# Patient Record
Sex: FEMALE | Race: WHITE | Employment: PART TIME | URBAN - METROPOLITAN AREA
[De-identification: names, ages, dates, MRNs, and addresses within clinical notes are randomized per-mention and may not be internally consistent; named-entity substitution may affect disease eponyms.]

---

## 2017-10-29 ENCOUNTER — HOSPITAL ENCOUNTER (OUTPATIENT)
Age: 57
Discharge: HOME | End: 2017-10-29
Payer: COMMERCIAL

## 2017-10-29 VITALS
OXYGEN SATURATION: 100 % | TEMPERATURE: 98 F | RESPIRATION RATE: 18 BRPM | DIASTOLIC BLOOD PRESSURE: 55 MMHG | SYSTOLIC BLOOD PRESSURE: 118 MMHG | HEART RATE: 78 BPM

## 2017-10-29 DIAGNOSIS — L50.0 ALLERGIC URTICARIA: ICD-10-CM

## 2017-10-29 DIAGNOSIS — T78.40XA ALLERGIC REACTION, INITIAL ENCOUNTER: Primary | ICD-10-CM

## 2017-10-29 PROCEDURE — 99214 OFFICE O/P EST MOD 30 MIN: CPT

## 2017-10-29 PROCEDURE — 99213 OFFICE O/P EST LOW 20 MIN: CPT

## 2017-10-29 RX ORDER — DIPHENHYDRAMINE HCL 25 MG
50 TABLET ORAL EVERY 6 HOURS PRN
Qty: 30 TABLET | Refills: 0 | Status: SHIPPED | OUTPATIENT
Start: 2017-10-29

## 2017-10-29 RX ORDER — CLINDAMYCIN HYDROCHLORIDE 150 MG/1
CAPSULE ORAL
COMMUNITY
Start: 2017-10-18

## 2017-10-29 RX ORDER — PREDNISONE 20 MG/1
40 TABLET ORAL DAILY
Qty: 10 TABLET | Refills: 0 | Status: SHIPPED | OUTPATIENT
Start: 2017-10-29 | End: 2017-11-03

## 2017-10-29 RX ORDER — FAMOTIDINE 20 MG/1
20 TABLET ORAL DAILY
Qty: 5 TABLET | Refills: 0 | Status: SHIPPED | OUTPATIENT
Start: 2017-10-29 | End: 2017-11-03

## 2017-10-29 NOTE — ED INITIAL ASSESSMENT (HPI)
REPORTS ITCHY RASH FROM KNEES TO CHEST SINCE Thursday, STATES SHE JUST FINISHED AN ANTIBIOTIC FROM HER DENTIST ON Thursday. PATIENT ALSO USING SILVER HYDROCOLLOID BANDAGES TO HER RIGHT HAND WOUND BEFORE RASH APPEARED.

## 2017-10-29 NOTE — ED PROVIDER NOTES
Patient presents with: Allergic Rxn Allergies (immune)      HPI:     Altagracia Bermudez is a 65year old female who presents today with a chief complaint of hives that started a couple days ago.   The patient states she just recently finished a 7 of a clindamy no distress  SKIN: good skin turgor, see above description for rash  HEENT: atraumatic, normocephalic, ears, nose and throat are clear. No swelling. Uvula midline. Speech clear. No dental swelling or abscess.   EYES: sclera non icteric bilateral. No swellin days

## 2023-10-27 ENCOUNTER — HOSPITAL ENCOUNTER (OUTPATIENT)
Age: 63
Discharge: HOME | End: 2023-10-27
Payer: COMMERCIAL

## 2023-10-27 ENCOUNTER — APPOINTMENT (OUTPATIENT)
Dept: GENERAL RADIOLOGY | Age: 63
End: 2023-10-27
Attending: NURSE PRACTITIONER
Payer: COMMERCIAL

## 2023-10-27 VITALS
OXYGEN SATURATION: 100 % | RESPIRATION RATE: 20 BRPM | TEMPERATURE: 97 F | HEART RATE: 73 BPM | SYSTOLIC BLOOD PRESSURE: 143 MMHG | DIASTOLIC BLOOD PRESSURE: 66 MMHG

## 2023-10-27 DIAGNOSIS — S62.660B OPEN NONDISPLACED FRACTURE OF DISTAL PHALANX OF RIGHT INDEX FINGER, INITIAL ENCOUNTER: Primary | ICD-10-CM

## 2023-10-27 PROCEDURE — 99203 OFFICE O/P NEW LOW 30 MIN: CPT

## 2023-10-27 PROCEDURE — 26750 TREAT FINGER FRACTURE EACH: CPT

## 2023-10-27 PROCEDURE — 73140 X-RAY EXAM OF FINGER(S): CPT | Performed by: NURSE PRACTITIONER

## 2023-10-27 PROCEDURE — 90471 IMMUNIZATION ADMIN: CPT

## 2023-10-27 RX ORDER — HYDROCODONE BITARTRATE AND ACETAMINOPHEN 5; 325 MG/1; MG/1
1-2 TABLET ORAL EVERY 6 HOURS PRN
Qty: 10 TABLET | Refills: 0 | Status: SHIPPED | OUTPATIENT
Start: 2023-10-27 | End: 2023-11-01

## 2023-10-27 RX ORDER — LIDOCAINE HYDROCHLORIDE 10 MG/ML
1 INJECTION, SOLUTION EPIDURAL; INFILTRATION; INTRACAUDAL; PERINEURAL ONCE
Status: COMPLETED | OUTPATIENT
Start: 2023-10-27 | End: 2023-10-27

## 2023-10-27 RX ORDER — CEPHALEXIN 500 MG/1
500 CAPSULE ORAL 4 TIMES DAILY
Qty: 40 CAPSULE | Refills: 0 | Status: SHIPPED | OUTPATIENT
Start: 2023-10-27 | End: 2023-11-06

## 2023-10-28 NOTE — DISCHARGE INSTRUCTIONS
Rest, ice, compress finger for pain. REFER TO HANDOUT FOR FURTHER DISCHARGE CARE. MAY TAKE OVER THE COUNTER MEDICATIONS SUCH AS TYLENOL (ACETAMINOPHEN)  OR MOTRIN (IBUPROFEN) FOR PAIN. -Keep wound and bandage on for the next 24 hrs.  -May wash wound with soap and water at least twice daily. Allow to dry and place antibiotic  ointment and band aid/dressing for 1-2 days only. When at home- leave wound open to air.  -No soaking, taking baths, swimming in the next 5 days.  -Change bandage as directed.     -See your PMD to have sutures removed in 10-14 days.  -Please call your doctor or return if you notice signs of infection including:  a)Redness  b)Swelling  c)Foul odor  d)Discharge  e)Fever  f)Increased Pain  g)If the wound becomes warm to the touch

## 2023-10-30 ENCOUNTER — TELEPHONE (OUTPATIENT)
Dept: SURGERY | Facility: CLINIC | Age: 63
End: 2023-10-30

## 2023-10-30 NOTE — TELEPHONE ENCOUNTER
Called patient and LVM x2 to schedule appt today at 12:30 with Dr. Rawleigh Boeck for finger laceration injury. Needs surgey.

## 2023-11-06 ENCOUNTER — OFFICE VISIT (OUTPATIENT)
Dept: SURGERY | Facility: CLINIC | Age: 63
End: 2023-11-06

## 2023-11-06 DIAGNOSIS — S62.662A CLOSED NONDISPLACED FRACTURE OF DISTAL PHALANX OF RIGHT MIDDLE FINGER, INITIAL ENCOUNTER: Primary | ICD-10-CM

## 2023-11-06 PROCEDURE — 99204 OFFICE O/P NEW MOD 45 MIN: CPT | Performed by: PLASTIC SURGERY

## 2023-11-06 NOTE — H&P
Luiza Santoro is a 59year old female that presents with Patient presents with:  Laceration: Laceration with fracture dorsal distal right middle finger   .     REFERRED BY:  Marli Sinclair    Pacemaker: No  Latex Allergy: no  Coumadin: No  Plavix: No  Other anticoagulants: No  Cardiac stents: No    HAND DOMINANCE:  Right    Profession:  special needs    RECONSTRUCTIVE HISTORY    SUN EXPOSURE   Current yes   Past yes   Sunburns yes   Tanning salons current no   Tanning salons past no     SKIN CANCER    Personal history of skin cancer: basal cell carcinoma, squamous cell carcinoma face, left hand and arm      HPI:       Injury 1: RMF DP comminuted, nondisplaced, seen 10 days postinjury  - Date: 10/27/23  - Days Since: 9      64-year-old female right-hand-dominant with RMF fracture     injury, ER, 1 suture and splinted  Keflex    Pain with bumping    Washing Neosporin    Stop wearing her splint 2 days ago            Review of Systems:   Constitutional: No change in appetite, chill/rigors, or fatigue  GI: No jaundice  Endocrine: No generalized weakness  Neurological: No aphasia, loss of consciousness, or seizures    Musculoskeletal:      Date of injury 10/27/23     Location right      middle finger   Dorsal distal    Mechanism Sustained laceration and fracture while cutting bushes with a hedger     Treatment Seen by ER, 1 suture placed, splinted started on oral keflex        Pain  yes when bumps 8/10      Numbness Yes to distal phalanx   Washing laceration with soap and water, applying neosporin and gauze daily  Pt removed splint 11/4    PMH:     MEDICAL  Past Medical History:   Diagnosis Date    Biceps tendon tear 2007    (R)    Vitamin D deficiency 2/29/12        SURGICAL  Past Surgical History:   Procedure Laterality Date    ANESTH,BICEPS TENDON REPAIR      (R)    DENTAL PROCEDURE      wisdom teeth    KNEE ARTHROSCP HARV      (R)        ALONSO Steele [Bupropion Hc* RASH  Clindamycin             RASH     MEDICATIONS  Current Outpatient Medications   Medication Sig Dispense Refill    Multiple Vitamin (MULTI VITAMIN DAILY OR) Take 1 tablet by mouth As Directed. CALCIUM OR Take 1 tablet by mouth As Directed. MAGNESIUM OR Take 1 tablet by mouth As Directed. cephalexin 500 MG Oral Cap Take 1 capsule (500 mg total) by mouth 4 (four) times daily for 10 days. 40 capsule 0    Clindamycin HCl 150 MG Oral Cap       DiphenhydrAMINE HCl (BENADRYL) 25 MG Oral Tab Take 2 tablets (50 mg total) by mouth every 6 (six) hours as needed for Itching. 30 tablet 0    Omega-3 Fatty Acids (FISH OIL) 1000 MG Oral Cap 1 capsule twice daily 60 Cap 0    VITAMIN D, ERGOCALCIFEROL, OR Take by mouth As Directed. SOCIAL HISTORY  Social History     Socioeconomic History    Marital status: Single    Number of children: 0   Occupational History    Occupation: [employer]    Tobacco Use    Smoking status: Every Day     Packs/day: 1.00     Years: 20.00     Additional pack years: 0.00     Total pack years: 20.00     Types: Cigarettes    Smokeless tobacco: Never   Substance and Sexual Activity    Alcohol use:  Yes     Alcohol/week: 0.0 - 1.7 standard drinks of alcohol    Drug use: No   Other Topics Concern    Right Handed Yes        FAMILY HISTORY  Family History   Problem Relation Age of Onset    Diabetes Mother           PHYSICAL EXAM:     CONSTITUTIONAL: Overall appearance - Normal  HEENT: Normocephalic  EYES: Conjunctiva - Right: Normal, Left: Normal; EOMI  EARS: Inspection - Right: Normal, Left: Normal  NECK/THYROID: Inspection - Normal, Palpation - Normal, Thyroid gland - Normal, No adenopathy  RESPIRATORY: Inspection - Normal, Effort - Normal  CARDIOVASCULAR: Regular rhythm, No murmurs  ABDOMEN: Inspection - Normal, No abdominal tenderness  NEURO: Memory intact  PSYCH: Oriented to person, place, time, and situation, Appropriate mood and affect      Hand Physical Exam:     RMF 1 cm transverse laceration, nail plate, nondisplaced, sutured, noninfected  Terminal slip and FDP intact  DIP collateral ligaments intact    X-ray independently interpreted: Comminuted fracture distal phalanx, nondisplaced    ASSESSMENT/PLAN:     Fracture: RMF distal phalanx, comminuted, nondisplaced, seen 10 days post injury  Laceration, sutured    We discussed the fracture/dislocation and the treatment options. Questions were answered and the patient wishes to proceed with treatment. SPLINT - This fracture can be treated with a splint. We discussed splint care and instructions, as well as restrictions. If there is displacement of the fracture, surgery may be required. We discussed restrictions. Even with satisfactory healing, the hand/digit may not regain normal ROM or normal function.       Finger extension splint  Wash daily, Polysporin, Band-Aid  2 weeks active range tendon gliding  3 weeks strengthening and resistive  4 weeks discontinue splint  5 weeks return to work regular duty, 11/11 11/6/2023  Clifford Garza MD        +++++++++++++++++++++++++++++++++++++++++      MEDICAL DECISION MAKING    PROBLEMS      MODERATE    (number / complexity)          Acute complicated injury    DATA         MODERATE    (amount / complexity)          X-rays independently reviewed    MANAGEMENT RISK  LOW    (complications/ morbidity)       Splint/OT                  MDM LEVEL    MODERATE

## 2023-11-07 ENCOUNTER — OFFICE VISIT (OUTPATIENT)
Dept: SURGERY | Facility: CLINIC | Age: 63
End: 2023-11-07

## 2023-11-07 DIAGNOSIS — M62.81 DISTAL MUSCLE WEAKNESS: Primary | ICD-10-CM

## 2023-11-07 DIAGNOSIS — M25.641 JOINT STIFFNESS OF HAND, RIGHT: ICD-10-CM

## 2023-11-07 PROCEDURE — 29130 APPL FINGER SPLINT STATIC: CPT | Performed by: OCCUPATIONAL THERAPIST

## 2023-11-07 NOTE — PROGRESS NOTES
Subjective: I hurt my RMF with a hedger. Objective:     Current level of performance:  ADL: Independent  Work:   Leisure: Not addressed    Measurements/Tests:  ROM:         N/A         Treatment Provided this day: Fabricated a RMF extension splint per order. Treatment Time: 20 minutes      Summary/Analysis of Treatment session: Tolerated the fabrication of  RMF extension splint well. Plan: To be compliant with the wear and care of RMF extension splint x 4 weeks: Follow up in:  x 2 weeks.           Geremias Salazar  OTR/L

## 2023-11-09 ENCOUNTER — HOSPITAL ENCOUNTER (OUTPATIENT)
Age: 63
Discharge: HOME | End: 2023-11-09
Payer: COMMERCIAL

## 2023-11-09 VITALS
DIASTOLIC BLOOD PRESSURE: 70 MMHG | RESPIRATION RATE: 16 BRPM | TEMPERATURE: 97 F | HEART RATE: 85 BPM | OXYGEN SATURATION: 98 % | SYSTOLIC BLOOD PRESSURE: 119 MMHG

## 2023-11-09 DIAGNOSIS — Z48.02 ENCOUNTER FOR REMOVAL OF SUTURES: Primary | ICD-10-CM

## 2023-11-09 NOTE — ED INITIAL ASSESSMENT (HPI)
Patient arrives ambulatory with need for stitch removal. Had stitches placed on 10/25 to right 3rd finger

## 2024-02-14 ENCOUNTER — HOSPITAL ENCOUNTER (OUTPATIENT)
Age: 64
Discharge: HOME | End: 2024-02-14
Attending: EMERGENCY MEDICINE
Payer: COMMERCIAL

## 2024-02-14 VITALS
OXYGEN SATURATION: 97 % | DIASTOLIC BLOOD PRESSURE: 60 MMHG | RESPIRATION RATE: 19 BRPM | HEART RATE: 95 BPM | TEMPERATURE: 98 F | SYSTOLIC BLOOD PRESSURE: 112 MMHG

## 2024-02-14 DIAGNOSIS — J20.9 ACUTE BRONCHITIS, UNSPECIFIED ORGANISM: Primary | ICD-10-CM

## 2024-02-14 LAB
POCT INFLUENZA A: NEGATIVE
POCT INFLUENZA B: NEGATIVE
S PYO AG THROAT QL IA.RAPID: NEGATIVE

## 2024-02-14 PROCEDURE — 99213 OFFICE O/P EST LOW 20 MIN: CPT

## 2024-02-14 PROCEDURE — 87651 STREP A DNA AMP PROBE: CPT | Performed by: EMERGENCY MEDICINE

## 2024-02-14 PROCEDURE — 87502 INFLUENZA DNA AMP PROBE: CPT | Performed by: EMERGENCY MEDICINE

## 2024-02-14 RX ORDER — AZITHROMYCIN 250 MG/1
TABLET, FILM COATED ORAL
Qty: 6 TABLET | Refills: 0 | Status: SHIPPED | OUTPATIENT
Start: 2024-02-14

## 2024-02-15 NOTE — DISCHARGE INSTRUCTIONS
Thank you for visiting our immediate care for your health care needs.  Please follow up with your regular doctor in the next 1-2 days.  If you have any additional problems please return to the immediate care.  Please take Zithromax as prescribed.

## 2024-02-15 NOTE — ED PROVIDER NOTES
Patient Seen in: [facility]      History     Chief Complaint   Patient presents with    Cough/URI    Nasal Congestion     Stated Complaint: cough and congestion    Subjective:   HPI    63-year-old female presents today for evaluation cough congestion for the past 2 weeks.  Patient is a smoker.  Is a schoolbus .  No fevers or chills.  No chest pain or shortness of breath.  No vomiting or diarrhea.  Symptoms are acute mild.    Objective:   No pertinent past medical history.            No pertinent past surgical history.              No pertinent social history.            Review of Systems    Positive for stated complaint: cough and congestion  Other systems are as noted in HPI.  Constitutional and vital signs reviewed.      All other systems reviewed and negative except as noted above.    Physical Exam     ED Triage Vitals [02/14/24 1816]   /60   Pulse 95   Resp 19   Temp 98.2 °F (36.8 °C)   Temp src Temporal   SpO2 97 %   O2 Device None (Room air)       Current:/60   Pulse 95   Temp 98.2 °F (36.8 °C) (Temporal)   Resp 19   SpO2 97%         Physical Exam  Vitals reviewed.   Constitutional:       General: She is not in acute distress.     Appearance: Normal appearance. She is not ill-appearing or toxic-appearing.   HENT:      Head: Normocephalic and atraumatic.      Mouth/Throat:      Mouth: Mucous membranes are moist.      Pharynx: Oropharynx is clear. No pharyngeal swelling, oropharyngeal exudate or posterior oropharyngeal erythema.   Eyes:      Extraocular Movements: Extraocular movements intact.      Conjunctiva/sclera: Conjunctivae normal.   Cardiovascular:      Rate and Rhythm: Normal rate and regular rhythm.   Pulmonary:      Effort: Pulmonary effort is normal.      Breath sounds: Normal breath sounds.   Musculoskeletal:      Cervical back: Neck supple.   Skin:     General: Skin is warm and dry.   Neurological:      Mental Status: She is alert and oriented to person, place,  and time.   Psychiatric:         Mood and Affect: Mood normal.               ED Course     Labs Reviewed   POCT FLU TEST - Normal    Narrative:     This assay is a rapid molecular in vitro test utilizing nucleic acid amplification of influenza A and B viral RNA.   RAPID STREP A - Normal                      MDM        63 year old female with viral symptoms.  Will check swabs.    Differential diagnosis (including but not limited to):  Flu, COVID, other viral syndrome    ED course:  Pulse Ox: 97% on room air which is normal      Comment: Please note this report has been produced using speech recognition software and may contain errors related to that system including errors in grammar, punctuation, and spelling, as well as words and phrases that may be inappropriate. If there are any questions or concerns please feel free to contact the dictating provider for clarification.                                     Medical Decision Making      Disposition and Plan     Clinical Impression:  1. Acute bronchitis, unspecified organism         Disposition:  There is no disposition on file for this visit.  There is no disposition time on file for this visit.    Follow-up:  Tj Eric MD  [address] [facility] [id number] [phone]                Medications Prescribed:  Current Discharge Medication List        START taking these medications    Details   azithromycin (ZITHROMAX Z-ANAMARIA) 250 MG Oral Tab 500 mg once followed by 250 mg daily x 4 days  Qty: 6 tablet, Refills: 0

## (undated) NOTE — LETTER
23      Patient: Mickie Ordonez  : 5/3/1960 Visit date: 2023    Dear Gris Rousseau,      I examined your patient in consultation today. She has a comminuted nondisplaced distal phalangeal fracture of the right middle finger, with a sutured noninfected laceration. We have fashioned a finger extension splint, and continue local care. Thank you for your kind referral. If I may answer any questions, please feel free to contact me.      Sincerely,   Clifford Garza MD     CC:   No Recipients